# Patient Record
Sex: MALE | Race: WHITE | Employment: FULL TIME | URBAN - METROPOLITAN AREA
[De-identification: names, ages, dates, MRNs, and addresses within clinical notes are randomized per-mention and may not be internally consistent; named-entity substitution may affect disease eponyms.]

---

## 2017-06-14 PROBLEM — S39.012A LOW BACK STRAIN, INITIAL ENCOUNTER: Status: ACTIVE | Noted: 2017-06-14

## 2018-04-10 ENCOUNTER — APPOINTMENT (OUTPATIENT)
Dept: GENERAL RADIOLOGY | Facility: HOSPITAL | Age: 48
End: 2018-04-10
Attending: EMERGENCY MEDICINE
Payer: COMMERCIAL

## 2018-04-10 ENCOUNTER — HOSPITAL ENCOUNTER (OUTPATIENT)
Facility: HOSPITAL | Age: 48
Setting detail: OBSERVATION
Discharge: HOME | End: 2018-04-12
Attending: EMERGENCY MEDICINE | Admitting: INTERNAL MEDICINE
Payer: COMMERCIAL

## 2018-04-10 ENCOUNTER — APPOINTMENT (OUTPATIENT)
Dept: CT IMAGING | Facility: HOSPITAL | Age: 48
End: 2018-04-10
Attending: EMERGENCY MEDICINE
Payer: COMMERCIAL

## 2018-04-10 DIAGNOSIS — R25.1 TREMOR: ICD-10-CM

## 2018-04-10 DIAGNOSIS — R47.81 SLURRED SPEECH: Primary | ICD-10-CM

## 2018-04-10 PROCEDURE — 85730 THROMBOPLASTIN TIME PARTIAL: CPT | Performed by: EMERGENCY MEDICINE

## 2018-04-10 PROCEDURE — 80053 COMPREHEN METABOLIC PANEL: CPT | Performed by: EMERGENCY MEDICINE

## 2018-04-10 PROCEDURE — 99285 EMERGENCY DEPT VISIT HI MDM: CPT

## 2018-04-10 PROCEDURE — 36415 COLL VENOUS BLD VENIPUNCTURE: CPT

## 2018-04-10 PROCEDURE — 83036 HEMOGLOBIN GLYCOSYLATED A1C: CPT | Performed by: OTHER

## 2018-04-10 PROCEDURE — 80320 DRUG SCREEN QUANTALCOHOLS: CPT | Performed by: EMERGENCY MEDICINE

## 2018-04-10 PROCEDURE — 93005 ELECTROCARDIOGRAM TRACING: CPT

## 2018-04-10 PROCEDURE — 71045 X-RAY EXAM CHEST 1 VIEW: CPT | Performed by: EMERGENCY MEDICINE

## 2018-04-10 PROCEDURE — 70498 CT ANGIOGRAPHY NECK: CPT | Performed by: EMERGENCY MEDICINE

## 2018-04-10 PROCEDURE — 85610 PROTHROMBIN TIME: CPT | Performed by: EMERGENCY MEDICINE

## 2018-04-10 PROCEDURE — 93010 ELECTROCARDIOGRAM REPORT: CPT

## 2018-04-10 PROCEDURE — 70450 CT HEAD/BRAIN W/O DYE: CPT | Performed by: EMERGENCY MEDICINE

## 2018-04-10 PROCEDURE — 96372 THER/PROPH/DIAG INJ SC/IM: CPT

## 2018-04-10 PROCEDURE — 70496 CT ANGIOGRAPHY HEAD: CPT | Performed by: EMERGENCY MEDICINE

## 2018-04-10 PROCEDURE — 85025 COMPLETE CBC W/AUTO DIFF WBC: CPT | Performed by: EMERGENCY MEDICINE

## 2018-04-10 RX ORDER — FENOFIBRATE 67 MG/1
67 CAPSULE ORAL
Status: DISCONTINUED | OUTPATIENT
Start: 2018-04-11 | End: 2018-04-12

## 2018-04-10 RX ORDER — ASPIRIN 325 MG
325 TABLET ORAL DAILY
Status: DISCONTINUED | OUTPATIENT
Start: 2018-04-10 | End: 2018-04-12

## 2018-04-10 RX ORDER — ACETAMINOPHEN, ASPIRIN AND CAFFEINE 250; 250; 65 MG/1; MG/1; MG/1
1 TABLET, FILM COATED ORAL DAILY PRN
COMMUNITY
End: 2019-12-03

## 2018-04-10 RX ORDER — ATORVASTATIN CALCIUM 20 MG/1
20 TABLET, FILM COATED ORAL NIGHTLY
Status: ON HOLD | COMMUNITY
End: 2018-04-12

## 2018-04-10 RX ORDER — ASPIRIN 300 MG
300 SUPPOSITORY, RECTAL RECTAL DAILY
Status: DISCONTINUED | OUTPATIENT
Start: 2018-04-10 | End: 2018-04-12

## 2018-04-10 RX ORDER — METOCLOPRAMIDE HYDROCHLORIDE 5 MG/ML
10 INJECTION INTRAMUSCULAR; INTRAVENOUS EVERY 8 HOURS PRN
Status: DISCONTINUED | OUTPATIENT
Start: 2018-04-10 | End: 2018-04-12

## 2018-04-10 RX ORDER — METOPROLOL SUCCINATE 50 MG/1
50 TABLET, EXTENDED RELEASE ORAL DAILY
COMMUNITY
End: 2018-09-21

## 2018-04-10 RX ORDER — FAMOTIDINE 20 MG/1
20 TABLET ORAL 2 TIMES DAILY
Status: DISCONTINUED | OUTPATIENT
Start: 2018-04-10 | End: 2018-04-12

## 2018-04-10 RX ORDER — ENOXAPARIN SODIUM 100 MG/ML
40 INJECTION SUBCUTANEOUS DAILY
Status: DISCONTINUED | OUTPATIENT
Start: 2018-04-10 | End: 2018-04-12

## 2018-04-10 RX ORDER — ATORVASTATIN CALCIUM 80 MG/1
80 TABLET, FILM COATED ORAL NIGHTLY
Status: DISCONTINUED | OUTPATIENT
Start: 2018-04-10 | End: 2018-04-12

## 2018-04-10 RX ORDER — LACTULOSE 10 G/15ML
30 SOLUTION ORAL ONCE
Status: DISCONTINUED | OUTPATIENT
Start: 2018-04-10 | End: 2018-04-10

## 2018-04-10 RX ORDER — LABETALOL HYDROCHLORIDE 5 MG/ML
10 INJECTION, SOLUTION INTRAVENOUS EVERY 10 MIN PRN
Status: DISCONTINUED | OUTPATIENT
Start: 2018-04-10 | End: 2018-04-12

## 2018-04-10 RX ORDER — CLOPIDOGREL BISULFATE 75 MG/1
75 TABLET ORAL DAILY
Status: DISCONTINUED | OUTPATIENT
Start: 2018-04-10 | End: 2018-04-12

## 2018-04-10 RX ORDER — BISACODYL 10 MG
10 SUPPOSITORY, RECTAL RECTAL
Status: DISCONTINUED | OUTPATIENT
Start: 2018-04-10 | End: 2018-04-10

## 2018-04-10 RX ORDER — ONDANSETRON 2 MG/ML
4 INJECTION INTRAMUSCULAR; INTRAVENOUS EVERY 6 HOURS PRN
Status: DISCONTINUED | OUTPATIENT
Start: 2018-04-10 | End: 2018-04-10

## 2018-04-10 RX ORDER — SENNOSIDES 8.6 MG
17.2 TABLET ORAL NIGHTLY
Status: DISCONTINUED | OUTPATIENT
Start: 2018-04-10 | End: 2018-04-12

## 2018-04-10 RX ORDER — ATORVASTATIN CALCIUM 20 MG/1
20 TABLET, FILM COATED ORAL NIGHTLY
Status: DISCONTINUED | OUTPATIENT
Start: 2018-04-10 | End: 2018-04-10 | Stop reason: DRUGHIGH

## 2018-04-10 RX ORDER — ACETAMINOPHEN 650 MG/1
650 SUPPOSITORY RECTAL EVERY 4 HOURS PRN
Status: DISCONTINUED | OUTPATIENT
Start: 2018-04-10 | End: 2018-04-12

## 2018-04-10 RX ORDER — MORPHINE SULFATE 4 MG/ML
2 INJECTION, SOLUTION INTRAMUSCULAR; INTRAVENOUS EVERY 2 HOUR PRN
Status: DISCONTINUED | OUTPATIENT
Start: 2018-04-10 | End: 2018-04-12

## 2018-04-10 RX ORDER — POLYETHYLENE GLYCOL 3350 17 G/17G
17 POWDER, FOR SOLUTION ORAL DAILY PRN
Status: DISCONTINUED | OUTPATIENT
Start: 2018-04-10 | End: 2018-04-12

## 2018-04-10 RX ORDER — SODIUM PHOSPHATE, DIBASIC AND SODIUM PHOSPHATE, MONOBASIC 7; 19 G/133ML; G/133ML
1 ENEMA RECTAL ONCE AS NEEDED
Status: DISCONTINUED | OUTPATIENT
Start: 2018-04-10 | End: 2018-04-10

## 2018-04-10 RX ORDER — SODIUM PHOSPHATE, DIBASIC AND SODIUM PHOSPHATE, MONOBASIC 7; 19 G/133ML; G/133ML
1 ENEMA RECTAL ONCE AS NEEDED
Status: DISCONTINUED | OUTPATIENT
Start: 2018-04-10 | End: 2018-04-12

## 2018-04-10 RX ORDER — ONDANSETRON 2 MG/ML
4 INJECTION INTRAMUSCULAR; INTRAVENOUS EVERY 6 HOURS PRN
Status: DISCONTINUED | OUTPATIENT
Start: 2018-04-10 | End: 2018-04-12

## 2018-04-10 RX ORDER — METOPROLOL SUCCINATE 50 MG/1
50 TABLET, EXTENDED RELEASE ORAL DAILY
Status: DISCONTINUED | OUTPATIENT
Start: 2018-04-10 | End: 2018-04-12

## 2018-04-10 RX ORDER — CLOPIDOGREL BISULFATE 75 MG/1
75 TABLET ORAL DAILY
COMMUNITY
End: 2018-09-21

## 2018-04-10 RX ORDER — ACETAMINOPHEN 325 MG/1
650 TABLET ORAL EVERY 4 HOURS PRN
Status: DISCONTINUED | OUTPATIENT
Start: 2018-04-10 | End: 2018-04-12

## 2018-04-10 RX ORDER — LEVOTHYROXINE SODIUM 0.12 MG/1
250 TABLET ORAL
Status: DISCONTINUED | OUTPATIENT
Start: 2018-04-11 | End: 2018-04-12

## 2018-04-10 RX ORDER — ACETAMINOPHEN 325 MG/1
650 TABLET ORAL EVERY 6 HOURS PRN
Status: DISCONTINUED | OUTPATIENT
Start: 2018-04-10 | End: 2018-04-10

## 2018-04-10 RX ORDER — SODIUM CHLORIDE 9 MG/ML
INJECTION, SOLUTION INTRAVENOUS CONTINUOUS
Status: DISCONTINUED | OUTPATIENT
Start: 2018-04-10 | End: 2018-04-11

## 2018-04-10 RX ORDER — MORPHINE SULFATE 4 MG/ML
1 INJECTION, SOLUTION INTRAMUSCULAR; INTRAVENOUS EVERY 2 HOUR PRN
Status: DISCONTINUED | OUTPATIENT
Start: 2018-04-10 | End: 2018-04-12

## 2018-04-10 RX ORDER — DOCUSATE SODIUM 100 MG/1
100 CAPSULE, LIQUID FILLED ORAL 2 TIMES DAILY
Status: DISCONTINUED | OUTPATIENT
Start: 2018-04-10 | End: 2018-04-12

## 2018-04-10 RX ORDER — FAMOTIDINE 10 MG/ML
20 INJECTION, SOLUTION INTRAVENOUS 2 TIMES DAILY
Status: DISCONTINUED | OUTPATIENT
Start: 2018-04-10 | End: 2018-04-12

## 2018-04-10 RX ORDER — ENOXAPARIN SODIUM 100 MG/ML
40 INJECTION SUBCUTANEOUS NIGHTLY
Status: DISCONTINUED | OUTPATIENT
Start: 2018-04-10 | End: 2018-04-10

## 2018-04-10 RX ORDER — PHENYLEPHRINE HCL IN 0.9% NACL 50MG/250ML
PLASTIC BAG, INJECTION (ML) INTRAVENOUS CONTINUOUS PRN
Status: DISCONTINUED | OUTPATIENT
Start: 2018-04-10 | End: 2018-04-12

## 2018-04-10 RX ORDER — POLYETHYLENE GLYCOL 3350 17 G/17G
17 POWDER, FOR SOLUTION ORAL DAILY PRN
Status: DISCONTINUED | OUTPATIENT
Start: 2018-04-10 | End: 2018-04-10

## 2018-04-10 RX ORDER — BISACODYL 10 MG
10 SUPPOSITORY, RECTAL RECTAL
Status: DISCONTINUED | OUTPATIENT
Start: 2018-04-10 | End: 2018-04-12
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
## 2018-04-11 ENCOUNTER — APPOINTMENT (OUTPATIENT)
Dept: MRI IMAGING | Facility: HOSPITAL | Age: 48
End: 2018-04-11
Attending: Other
Payer: COMMERCIAL

## 2018-04-11 ENCOUNTER — APPOINTMENT (OUTPATIENT)
Dept: CV DIAGNOSTICS | Facility: HOSPITAL | Age: 48
End: 2018-04-11
Attending: Other
Payer: COMMERCIAL

## 2018-04-11 PROCEDURE — 85025 COMPLETE CBC W/AUTO DIFF WBC: CPT | Performed by: HOSPITALIST

## 2018-04-11 PROCEDURE — 97165 OT EVAL LOW COMPLEX 30 MIN: CPT

## 2018-04-11 PROCEDURE — 70553 MRI BRAIN STEM W/O & W/DYE: CPT | Performed by: OTHER

## 2018-04-11 PROCEDURE — 93306 TTE W/DOPPLER COMPLETE: CPT | Performed by: OTHER

## 2018-04-11 PROCEDURE — 84443 ASSAY THYROID STIM HORMONE: CPT | Performed by: HOSPITALIST

## 2018-04-11 PROCEDURE — 80048 BASIC METABOLIC PNL TOTAL CA: CPT | Performed by: HOSPITALIST

## 2018-04-11 PROCEDURE — 83721 ASSAY OF BLOOD LIPOPROTEIN: CPT | Performed by: OTHER

## 2018-04-11 PROCEDURE — 95819 EEG AWAKE AND ASLEEP: CPT

## 2018-04-11 PROCEDURE — 80061 LIPID PANEL: CPT | Performed by: OTHER

## 2018-04-11 PROCEDURE — 80307 DRUG TEST PRSMV CHEM ANLYZR: CPT | Performed by: EMERGENCY MEDICINE

## 2018-04-11 PROCEDURE — A9576 INJ PROHANCE MULTIPACK: HCPCS | Performed by: INTERNAL MEDICINE

## 2018-04-11 PROCEDURE — 97161 PT EVAL LOW COMPLEX 20 MIN: CPT

## 2018-04-11 PROCEDURE — 81003 URINALYSIS AUTO W/O SCOPE: CPT | Performed by: EMERGENCY MEDICINE

## 2018-04-11 PROCEDURE — 82962 GLUCOSE BLOOD TEST: CPT

## 2018-04-11 PROCEDURE — 85610 PROTHROMBIN TIME: CPT | Performed by: OTHER

## 2018-04-11 PROCEDURE — 83735 ASSAY OF MAGNESIUM: CPT | Performed by: HOSPITALIST

## 2018-04-11 PROCEDURE — 92610 EVALUATE SWALLOWING FUNCTION: CPT

## 2018-04-11 RX ORDER — BUPROPION HYDROCHLORIDE 150 MG/1
150 TABLET, EXTENDED RELEASE ORAL 2 TIMES DAILY
Status: DISCONTINUED | OUTPATIENT
Start: 2018-04-11 | End: 2018-04-12

## 2018-04-11 RX ORDER — POTASSIUM CHLORIDE 20 MEQ/1
40 TABLET, EXTENDED RELEASE ORAL ONCE
Status: COMPLETED | OUTPATIENT
Start: 2018-04-11 | End: 2018-04-11
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
## 2018-04-11 NOTE — PLAN OF CARE
Patient alert and oriented. NSR on tele. Room air. Echo completed. EEG completed. Waiting for MRI. Neuro s/s have all resolved. NIH 0. Patient is anxious to leave.      CARDIOVASCULAR - ADULT    • Maintains optimal cardiac output and hemodynamic stability P

## 2018-04-11 NOTE — PROGRESS NOTES
NURSING ADMISSION NOTE      Patient admitted via cart from the ED. Oriented to room. Safety precautions initiated. Bed in low position. Call light in reach.
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
## 2018-04-11 NOTE — CONSULTS
[address] [facility]  Neurology  [facility] Consultation Report    Leslye Ask Patient Status:  Observation    1970 MRN [id number]   [facility] 7NE-A Attending Marcelo Sifuentes,    Hosp Day # 0 PCP Camila Solis MD   Date of Admiss ANGIOPLASTY (CORONARY)  No date: APPENDECTOMY  No date: REMOVAL OF SPERM DUCT(S)  5/4/16: STENT, COATED/COV W/DEL SYS Right      Comment: stents x2 rca and l stent to lad  Family History  Family History   Problem Relation Age of Onset   • Diabetes Father (DULCOLAX) rectal suppository 10 mg 10 mg Rectal Daily PRN   FLEET ENEMA (FLEET) 7-19 GM/118ML enema 133 mL 1 enema Rectal Once PRN   ondansetron HCl (ZOFRAN) injection 4 mg 4 mg Intravenous Q6H PRN   Or      Metoclopramide HCl (REGLAN) injection 10 mg 10 denies abdominal pain, denies heartburn, denies vomiting, constipation,diarrhea,nausea; no rectal bleeding  : no complaint of urinary incontinence  PSYCH: denies depression or anxiety  HEMATOLOGIC: no reports of excessive bruising or bleeding  ENDOCRINE: present at 2+ throughout. There is no clonus at the ankle.     Results:   Laboratory Data:    Lab Results  Component Value Date   WBC 5.8 04/11/2018   HGB 15.2 04/11/2018   HCT 43.5 04/11/2018   .0 04/11/2018   CREATSERUM 1.18 04/10/2018   BUN 28 multiple risk factors for stroke. Primary risk reduction is recommended to continue, clopidogrel is recommended for stroke prophylaxis. There is no evidence on CT angiogram for arterial dissection. Echocardiography will be done later today.     I've expl
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
## 2018-04-11 NOTE — PROCEDURES
ELECTROENCEPHALOGRAM REPORT      Patient Name: Marshall Lanier  Chart ID: [id number]  Ordering Physician: Ruthy Vergara M.D. Date of Test: April 11, 2018  Patient Diagnosis: involuntary movements of the right arm    A routine EEG was obtained.   No
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
## 2018-04-11 NOTE — PLAN OF CARE
Pt here for slurred speech and right arm tremor. A&Ox4. NSR on tele, denies any chest pain. BP improved after receiving scheduled medications. Presenting symptoms resolved. Neuro assessment stable, no deficits.  Pt does report that he has been having short

## 2018-04-11 NOTE — CM/SW NOTE
Pt on CVA protocol. Pt not found to have a CVA. Pt was screened during rounds and no needs are identified at this time. RN to contact SW/CM if needs arise.       04/11/18 1200   CM/SW Screening   Referral Source Physician   Information Source Nursing rou
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
## 2018-04-12 VITALS
HEIGHT: 70 IN | RESPIRATION RATE: 18 BRPM | DIASTOLIC BLOOD PRESSURE: 95 MMHG | SYSTOLIC BLOOD PRESSURE: 152 MMHG | HEART RATE: 73 BPM | BODY MASS INDEX: 38 KG/M2 | WEIGHT: 265.44 LBS | TEMPERATURE: 98 F | OXYGEN SATURATION: 98 %

## 2018-04-12 PROCEDURE — 84132 ASSAY OF SERUM POTASSIUM: CPT | Performed by: OTHER

## 2018-04-12 PROCEDURE — 82962 GLUCOSE BLOOD TEST: CPT

## 2018-04-12 RX ORDER — ASPIRIN 81 MG/1
81 TABLET ORAL DAILY
Status: DISCONTINUED | OUTPATIENT
Start: 2018-04-12 | End: 2018-04-12

## 2018-04-12 RX ORDER — ATORVASTATIN CALCIUM 80 MG/1
80 TABLET, FILM COATED ORAL NIGHTLY
Qty: 90 TABLET | Refills: 0 | Status: SHIPPED | OUTPATIENT
Start: 2018-04-12 | End: 2018-09-19

## 2018-04-12 NOTE — CM/SW NOTE
04/12/18 1400   Discharge disposition   Expected discharge disposition Home or Self   Name of [facility] services after discharge None   Discharge transportation Private car

## 2018-04-12 NOTE — SLP NOTE
MRI negative for CVA; communication screening per CVA protocol is not warranted at this time. Will d/c from services at this time.     Ceci Baez MA, [id number] [facility]  Pager

## 2018-04-12 NOTE — CONSULTS
[facility] Cardiology Consultation    Bel Johnson Patient Status:  Observation    1970 MRN [id number]   [facility] 7NE-A Attending Mylene Lovett MD   Hosp Day # 0 PCP Aria Wood MD     Reason for Consultation:  TIA, CAD      History of Intravenous BID   • enoxaparin  40 mg Subcutaneous Daily   • Clopidogrel Bisulfate  75 mg Oral Daily   • Metoprolol Succinate ER  50 mg Oral Daily       Continuous Infusions:  • NiCARdipine     • phenylephrine in NaCl         Social History:   reports that neuro sx's - per neuro. Possible TIA. 2. CAD with PCI of LAD and RCA 5/16. Low normal EF, anteroapical hypokinesis on echo - stable. 3. Tobacco abuse  4. Non compliance  5. HTN  6. HL        Plan:  Long talk with him.   I reinforced the need for physici

## 2018-04-12 NOTE — PLAN OF CARE
Pt discharged per walking with this RN. In no apparent distress and no c/o. Cards and neuro ok with d/c. Pt has f/u appts with primary and with cards. Neuro will be prn. D/c instr and meds reviewed.

## 2018-04-12 NOTE — PLAN OF CARE
Assumed care at 1900. Alert and oriented x4. Telemetry monitor reading SR. Assessment remains unchanged. Plan new cards consult. Call light in reach at bedside. Will continue to monitor.     CARDIOVASCULAR - ADULT    • Maintains optimal cardiac output and h

## 2018-04-16 NOTE — PROGRESS NOTES
[facility] Hospitalist note    PCP: Darius Hernandez MD    Chief Complaint:  f/u    SUBJECTIVE:  Feeling improved. Wants to go home.     OBJECTIVE:  Vitals stable    Intake/Output:  No intake or output data in the 24 hours ending 04/16/18 1703    Last 3 Weights  04/12 Ness Adan MD  [facility] Hospitalist  Pager: [phone]

## 2018-09-20 ENCOUNTER — CHARTING TRANS (OUTPATIENT)
Dept: OTHER | Age: 48
End: 2018-09-20

## 2018-09-20 ASSESSMENT — PAIN SCALES - GENERAL: PAINLEVEL_OUTOF10: 10

## 2018-09-28 PROBLEM — K57.92 DIVERTICULITIS: Status: ACTIVE | Noted: 2018-09-28

## 2019-03-05 VITALS
DIASTOLIC BLOOD PRESSURE: 122 MMHG | TEMPERATURE: 97.8 F | OXYGEN SATURATION: 95 % | RESPIRATION RATE: 20 BRPM | HEART RATE: 109 BPM | SYSTOLIC BLOOD PRESSURE: 196 MMHG

## 2019-06-04 ENCOUNTER — HOSPITAL ENCOUNTER (EMERGENCY)
Facility: HOSPITAL | Age: 49
Discharge: HOME | End: 2019-06-04
Attending: EMERGENCY MEDICINE
Payer: COMMERCIAL

## 2019-06-04 ENCOUNTER — APPOINTMENT (OUTPATIENT)
Dept: GENERAL RADIOLOGY | Facility: HOSPITAL | Age: 49
End: 2019-06-04
Attending: EMERGENCY MEDICINE
Payer: COMMERCIAL

## 2019-06-04 ENCOUNTER — APPOINTMENT (OUTPATIENT)
Dept: CT IMAGING | Facility: HOSPITAL | Age: 49
End: 2019-06-04
Attending: EMERGENCY MEDICINE
Payer: COMMERCIAL

## 2019-06-04 VITALS
OXYGEN SATURATION: 95 % | TEMPERATURE: 99 F | RESPIRATION RATE: 21 BRPM | HEART RATE: 71 BPM | DIASTOLIC BLOOD PRESSURE: 109 MMHG | SYSTOLIC BLOOD PRESSURE: 158 MMHG

## 2019-06-04 DIAGNOSIS — I15.9 SECONDARY HYPERTENSION: Primary | ICD-10-CM

## 2019-06-04 DIAGNOSIS — R10.9 ABDOMINAL PAIN OF UNKNOWN ETIOLOGY: ICD-10-CM

## 2019-06-04 PROCEDURE — 96374 THER/PROPH/DIAG INJ IV PUSH: CPT

## 2019-06-04 PROCEDURE — 99285 EMERGENCY DEPT VISIT HI MDM: CPT

## 2019-06-04 PROCEDURE — 80053 COMPREHEN METABOLIC PANEL: CPT | Performed by: EMERGENCY MEDICINE

## 2019-06-04 PROCEDURE — 93005 ELECTROCARDIOGRAM TRACING: CPT

## 2019-06-04 PROCEDURE — 74177 CT ABD & PELVIS W/CONTRAST: CPT | Performed by: EMERGENCY MEDICINE

## 2019-06-04 PROCEDURE — 81003 URINALYSIS AUTO W/O SCOPE: CPT | Performed by: EMERGENCY MEDICINE

## 2019-06-04 PROCEDURE — 83690 ASSAY OF LIPASE: CPT | Performed by: EMERGENCY MEDICINE

## 2019-06-04 PROCEDURE — 85025 COMPLETE CBC W/AUTO DIFF WBC: CPT | Performed by: EMERGENCY MEDICINE

## 2019-06-04 PROCEDURE — 71046 X-RAY EXAM CHEST 2 VIEWS: CPT | Performed by: EMERGENCY MEDICINE

## 2019-06-04 PROCEDURE — 93010 ELECTROCARDIOGRAM REPORT: CPT

## 2019-06-04 PROCEDURE — 84484 ASSAY OF TROPONIN QUANT: CPT | Performed by: EMERGENCY MEDICINE

## 2019-06-04 RX ORDER — METOPROLOL SUCCINATE 100 MG/1
100 TABLET, EXTENDED RELEASE ORAL DAILY
Qty: 30 TABLET | Refills: 0 | Status: SHIPPED | OUTPATIENT
Start: 2019-06-04 | End: 2019-06-10

## 2019-06-04 RX ORDER — LABETALOL HYDROCHLORIDE 5 MG/ML
20 INJECTION, SOLUTION INTRAVENOUS ONCE
Status: COMPLETED | OUTPATIENT
Start: 2019-06-04 | End: 2019-06-04

## 2019-06-04 NOTE — ED PROVIDER NOTES
Patient Seen in: [facility] Emergency Department    History   Patient presents with:  Hypertension (cardiovascular)    Stated Complaint: hypertension    HPI    This is a 50-year-old male who arrives here with complaints of high blood pressure.   The stated complaint: hypertension  Other systems are as noted in HPI. Constitutional and vital signs reviewed. All other systems reviewed and negative except as noted above.     Physical Exam     ED Triage Vitals [06/04/19 1120]   BP (!) 210/140   Pulse Final result                 Please view results for these tests on the individual orders.    URINALYSIS WITH CULTURE REFLEX   RAINBOW DRAW BLUE   RAINBOW DRAW LAVENDER   RAINBOW DRAW LIGHT GREEN   RAINBOW DRAW GOLD   CBC W/ DIFFERENTIAL             The the ACR (American College of Radiology) NRDR ([address]) which includes the Dose Index Registry. PATIENT STATED HISTORY:(As transcribed by Technologist)  Patient is here wuth LLQ pain from 3 weeks.  Patient was diagnosed with duverti normal limits. ,  Troponin, CBC was normal.    He does have some some nonspecific abnormalities on the work-up and needs a follow-up. I have also discussed with him he may need a colonoscopy. .  I discussed him take 1 baby aspirin.   Increase his Toprol to 1

## 2019-06-04 NOTE — ED INITIAL ASSESSMENT (HPI)
Patient presents with high blood pressure, states it was 185/135. Patient states he has a pain in lower back to left hip for several weeks. Patient had stopped taking his medications since October, hx of stents.

## 2019-12-27 PROCEDURE — 88305 TISSUE EXAM BY PATHOLOGIST: CPT | Performed by: INTERNAL MEDICINE

## 2021-06-17 ENCOUNTER — TELEPHONE (OUTPATIENT)
Dept: NEUROLOGY | Age: 51
End: 2021-06-17

## 2021-06-17 DIAGNOSIS — R52 PAIN: Primary | ICD-10-CM

## 2021-09-17 PROBLEM — E66.01 OBESITY, MORBID (HCC): Status: ACTIVE | Noted: 2021-09-17

## 2021-09-17 PROBLEM — E78.5 DYSLIPIDEMIA: Status: ACTIVE | Noted: 2021-09-17

## 2021-09-17 PROBLEM — R06.83 SNORING: Status: ACTIVE | Noted: 2021-09-17

## 2022-10-01 ENCOUNTER — APPOINTMENT (OUTPATIENT)
Dept: GENERAL RADIOLOGY | Facility: HOSPITAL | Age: 52
End: 2022-10-01
Attending: EMERGENCY MEDICINE
Payer: COMMERCIAL

## 2022-10-01 ENCOUNTER — HOSPITAL ENCOUNTER (EMERGENCY)
Facility: HOSPITAL | Age: 52
Discharge: LEFT BEFORE DISCHARGE | End: 2022-10-01
Attending: EMERGENCY MEDICINE
Payer: COMMERCIAL

## 2022-10-01 VITALS
DIASTOLIC BLOOD PRESSURE: 96 MMHG | TEMPERATURE: 98 F | BODY MASS INDEX: 42.95 KG/M2 | RESPIRATION RATE: 23 BRPM | OXYGEN SATURATION: 95 % | WEIGHT: 300 LBS | HEART RATE: 79 BPM | HEIGHT: 70 IN | SYSTOLIC BLOOD PRESSURE: 149 MMHG

## 2022-10-01 DIAGNOSIS — R07.9 EXERTIONAL CHEST PAIN: Primary | ICD-10-CM

## 2022-10-01 LAB
ALBUMIN SERPL-MCNC: 3.8 G/DL (ref 3.4–5)
ALBUMIN/GLOB SERPL: 1 {RATIO} (ref 1–2)
ALP LIVER SERPL-CCNC: 111 U/L
ALT SERPL-CCNC: 135 U/L
ANION GAP SERPL CALC-SCNC: 6 MMOL/L (ref 0–18)
AST SERPL-CCNC: 49 U/L (ref 15–37)
BASOPHILS # BLD AUTO: 0.06 X10(3) UL (ref 0–0.2)
BASOPHILS NFR BLD AUTO: 0.9 %
BILIRUB SERPL-MCNC: 0.5 MG/DL (ref 0.1–2)
BUN BLD-MCNC: 14 MG/DL (ref 7–18)
CALCIUM BLD-MCNC: 9 MG/DL (ref 8.5–10.1)
CHLORIDE SERPL-SCNC: 106 MMOL/L (ref 98–112)
CHOLEST SERPL-MCNC: 138 MG/DL (ref ?–200)
CO2 SERPL-SCNC: 25 MMOL/L (ref 21–32)
CREAT BLD-MCNC: 0.96 MG/DL
EOSINOPHIL # BLD AUTO: 0.21 X10(3) UL (ref 0–0.7)
EOSINOPHIL NFR BLD AUTO: 3.2 %
ERYTHROCYTE [DISTWIDTH] IN BLOOD BY AUTOMATED COUNT: 12.4 %
GFR SERPLBLD BASED ON 1.73 SQ M-ARVRAT: 96 ML/MIN/1.73M2 (ref 60–?)
GLOBULIN PLAS-MCNC: 4 G/DL (ref 2.8–4.4)
GLUCOSE BLD-MCNC: 146 MG/DL (ref 70–99)
HCT VFR BLD AUTO: 42.4 %
HDLC SERPL-MCNC: 35 MG/DL (ref 40–59)
HGB BLD-MCNC: 15 G/DL
IMM GRANULOCYTES # BLD AUTO: 0.03 X10(3) UL (ref 0–1)
IMM GRANULOCYTES NFR BLD: 0.5 %
LDLC SERPL CALC-MCNC: 76 MG/DL (ref ?–100)
LYMPHOCYTES # BLD AUTO: 2.11 X10(3) UL (ref 1–4)
LYMPHOCYTES NFR BLD AUTO: 31.7 %
MCH RBC QN AUTO: 30.4 PG (ref 26–34)
MCHC RBC AUTO-ENTMCNC: 35.4 G/DL (ref 31–37)
MCV RBC AUTO: 86 FL
MONOCYTES # BLD AUTO: 0.66 X10(3) UL (ref 0.1–1)
MONOCYTES NFR BLD AUTO: 9.9 %
NEUTROPHILS # BLD AUTO: 3.58 X10 (3) UL (ref 1.5–7.7)
NEUTROPHILS # BLD AUTO: 3.58 X10(3) UL (ref 1.5–7.7)
NEUTROPHILS NFR BLD AUTO: 53.8 %
NONHDLC SERPL-MCNC: 103 MG/DL (ref ?–130)
NT-PROBNP SERPL-MCNC: 17 PG/ML (ref ?–125)
OSMOLALITY SERPL CALC.SUM OF ELEC: 287 MOSM/KG (ref 275–295)
PLATELET # BLD AUTO: 202 10(3)UL (ref 150–450)
POTASSIUM SERPL-SCNC: 3.9 MMOL/L (ref 3.5–5.1)
PROT SERPL-MCNC: 7.8 G/DL (ref 6.4–8.2)
RBC # BLD AUTO: 4.93 X10(6)UL
SARS-COV-2 RNA RESP QL NAA+PROBE: NOT DETECTED
SODIUM SERPL-SCNC: 137 MMOL/L (ref 136–145)
TRIGL SERPL-MCNC: 152 MG/DL (ref 30–149)
TROPONIN I HIGH SENSITIVITY: 140 NG/L
TROPONIN I HIGH SENSITIVITY: 147 NG/L
VLDLC SERPL CALC-MCNC: 24 MG/DL (ref 0–30)
WBC # BLD AUTO: 6.7 X10(3) UL (ref 4–11)

## 2022-10-01 PROCEDURE — 99285 EMERGENCY DEPT VISIT HI MDM: CPT

## 2022-10-01 PROCEDURE — 83880 ASSAY OF NATRIURETIC PEPTIDE: CPT | Performed by: EMERGENCY MEDICINE

## 2022-10-01 PROCEDURE — 80061 LIPID PANEL: CPT | Performed by: EMERGENCY MEDICINE

## 2022-10-01 PROCEDURE — 93010 ELECTROCARDIOGRAM REPORT: CPT

## 2022-10-01 PROCEDURE — 93005 ELECTROCARDIOGRAM TRACING: CPT

## 2022-10-01 PROCEDURE — 85025 COMPLETE CBC W/AUTO DIFF WBC: CPT | Performed by: EMERGENCY MEDICINE

## 2022-10-01 PROCEDURE — 36415 COLL VENOUS BLD VENIPUNCTURE: CPT

## 2022-10-01 PROCEDURE — 71045 X-RAY EXAM CHEST 1 VIEW: CPT | Performed by: EMERGENCY MEDICINE

## 2022-10-01 PROCEDURE — 80053 COMPREHEN METABOLIC PANEL: CPT | Performed by: EMERGENCY MEDICINE

## 2022-10-01 PROCEDURE — 84484 ASSAY OF TROPONIN QUANT: CPT | Performed by: EMERGENCY MEDICINE

## 2022-10-01 PROCEDURE — 99284 EMERGENCY DEPT VISIT MOD MDM: CPT

## 2022-10-01 NOTE — ED INITIAL ASSESSMENT (HPI)
Pt reports intermittent chest pain x2 weeks that is not improving. Pt was at his doctors office today due to the chest pain and was instructed to come to the ER. Pt denies any pain at this time, reports pain occurs when he lays on his side, going up the stairs, or walk long distances, or when it is cold. Pt took 4 baby aspirin PTA.

## 2022-10-02 LAB
ATRIAL RATE: 66 BPM
P AXIS: 38 DEGREES
P-R INTERVAL: 180 MS
Q-T INTERVAL: 396 MS
QRS DURATION: 90 MS
QTC CALCULATION (BEZET): 415 MS
R AXIS: 11 DEGREES
T AXIS: 36 DEGREES
VENTRICULAR RATE: 66 BPM

## 2022-10-06 NOTE — H&P
Patient seen and examined independently. H and P dated 10/6/22 reviewed. No changes in H and P. Risks and benefits of procedure were discussed with patient. Airway examined. Patient is ASA class 2 and Mallampati class 2. Pt is appropriate for conscious sedation. No history of difficult airway. The risks, benefits, indications and alternatives of left heart catheterization and coronary angigoraphy with possible percutaneous coronary intervention were discussed. The risks include, but are not limited to: bleeding, anemia requiring blood transfusion, allergic reaction, hypotension, infection, vascular injury, injury to upper or lower extremity requiring endovascular or open surgical repair,  kidney failure, stroke, cardiac or pulmonary artery perforation, pericardial effusion and tamponade requiring pericardiocentesis, myocardial infarction (heart attack), respiratory failure needing intubation, cardiac arrest, need for emergent surgery, and death. Benefits of the procedure include: symptomatic improvement, diagnosis of coronary artery disease or other forms of heart disease and possibly prevention of myocardial infarction. Alternatives to the procedure include: not performing cardiac catheterization, treatment with medications only, and observation. The patient and any accompanying family have considered the above, all questions have been answered to the best of my ability to their satisfaction, and have decided to proceed with the procedure. Appropriate candidate for Sedation/Analgesia: Yes  Plan for Sedation reviewed: Yes    Explained Anesthesia options and attendant risks, and have determined patient is an appropriate candidate.  Yes  Consent for Sedation obtained: Yes  Patient reevaluated immediately prior to Sedation/Analgesia: Yes

## 2022-10-11 ENCOUNTER — HOSPITAL ENCOUNTER (OUTPATIENT)
Dept: INTERVENTIONAL RADIOLOGY/VASCULAR | Facility: HOSPITAL | Age: 52
Discharge: HOME | End: 2022-10-12
Attending: INTERNAL MEDICINE | Admitting: INTERNAL MEDICINE
Payer: COMMERCIAL

## 2022-10-11 DIAGNOSIS — I20.9 ANGINA PECTORIS (HCC): ICD-10-CM

## 2022-10-11 LAB — ISTAT ACTIVATED CLOTTING TIME: 254 SECONDS (ref 74–137)

## 2022-10-11 PROCEDURE — 99153 MOD SED SAME PHYS/QHP EA: CPT | Performed by: INTERNAL MEDICINE

## 2022-10-11 PROCEDURE — 85347 COAGULATION TIME ACTIVATED: CPT

## 2022-10-11 PROCEDURE — 99152 MOD SED SAME PHYS/QHP 5/>YRS: CPT | Performed by: INTERNAL MEDICINE

## 2022-10-11 PROCEDURE — 93005 ELECTROCARDIOGRAM TRACING: CPT

## 2022-10-11 PROCEDURE — 4A023N7 MEASUREMENT OF CARDIAC SAMPLING AND PRESSURE, LEFT HEART, PERCUTANEOUS APPROACH: ICD-10-PCS | Performed by: INTERNAL MEDICINE

## 2022-10-11 PROCEDURE — 0270346 DILATION OF CORONARY ARTERY, ONE ARTERY, BIFURCATION, WITH DRUG-ELUTING INTRALUMINAL DEVICE, PERCUTANEOUS APPROACH: ICD-10-PCS | Performed by: INTERNAL MEDICINE

## 2022-10-11 PROCEDURE — B240ZZ3 ULTRASONOGRAPHY OF SINGLE CORONARY ARTERY, INTRAVASCULAR: ICD-10-PCS | Performed by: INTERNAL MEDICINE

## 2022-10-11 PROCEDURE — B2151ZZ FLUOROSCOPY OF LEFT HEART USING LOW OSMOLAR CONTRAST: ICD-10-PCS | Performed by: INTERNAL MEDICINE

## 2022-10-11 PROCEDURE — 92978 ENDOLUMINL IVUS OCT C 1ST: CPT | Performed by: INTERNAL MEDICINE

## 2022-10-11 PROCEDURE — 93458 L HRT ARTERY/VENTRICLE ANGIO: CPT | Performed by: INTERNAL MEDICINE

## 2022-10-11 PROCEDURE — B2111ZZ FLUOROSCOPY OF MULTIPLE CORONARY ARTERIES USING LOW OSMOLAR CONTRAST: ICD-10-PCS | Performed by: INTERNAL MEDICINE

## 2022-10-11 PROCEDURE — 93010 ELECTROCARDIOGRAM REPORT: CPT | Performed by: INTERNAL MEDICINE

## 2022-10-11 PROCEDURE — 92921 HC PTCA EA ADDL MAJOR CORONARY ARTERY/BRANCH: CPT | Performed by: INTERNAL MEDICINE

## 2022-10-11 RX ORDER — ATORVASTATIN CALCIUM 80 MG/1
80 TABLET, FILM COATED ORAL DAILY
Status: DISCONTINUED | OUTPATIENT
Start: 2022-10-12 | End: 2022-10-12

## 2022-10-11 RX ORDER — LIDOCAINE HYDROCHLORIDE 10 MG/ML
INJECTION, SOLUTION EPIDURAL; INFILTRATION; INTRACAUDAL; PERINEURAL
Status: COMPLETED
Start: 2022-10-11 | End: 2022-10-11

## 2022-10-11 RX ORDER — SODIUM CHLORIDE 9 MG/ML
INJECTION, SOLUTION INTRAVENOUS CONTINUOUS
Status: ACTIVE | OUTPATIENT
Start: 2022-10-11 | End: 2022-10-11

## 2022-10-11 RX ORDER — METOPROLOL SUCCINATE 100 MG/1
100 TABLET, EXTENDED RELEASE ORAL
Status: DISCONTINUED | OUTPATIENT
Start: 2022-10-12 | End: 2022-10-12

## 2022-10-11 RX ORDER — NITROGLYCERIN 20 MG/100ML
INJECTION INTRAVENOUS
Status: COMPLETED
Start: 2022-10-11 | End: 2022-10-11

## 2022-10-11 RX ORDER — LISINOPRIL 40 MG/1
40 TABLET ORAL DAILY
Status: DISCONTINUED | OUTPATIENT
Start: 2022-10-11 | End: 2022-10-12

## 2022-10-11 RX ORDER — MIDAZOLAM HYDROCHLORIDE 1 MG/ML
INJECTION INTRAMUSCULAR; INTRAVENOUS
Status: COMPLETED
Start: 2022-10-11 | End: 2022-10-11

## 2022-10-11 RX ORDER — VERAPAMIL HYDROCHLORIDE 2.5 MG/ML
INJECTION, SOLUTION INTRAVENOUS
Status: COMPLETED
Start: 2022-10-11 | End: 2022-10-11

## 2022-10-11 RX ORDER — FENOFIBRATE 67 MG/1
67 CAPSULE ORAL
Refills: 0 | Status: DISCONTINUED | OUTPATIENT
Start: 2022-10-12 | End: 2022-10-12

## 2022-10-11 RX ORDER — HYDROCHLOROTHIAZIDE 25 MG/1
25 TABLET ORAL DAILY
Status: DISCONTINUED | OUTPATIENT
Start: 2022-10-11 | End: 2022-10-12

## 2022-10-11 RX ORDER — ASPIRIN 81 MG/1
81 TABLET, CHEWABLE ORAL DAILY
Status: DISCONTINUED | OUTPATIENT
Start: 2022-10-12 | End: 2022-10-12

## 2022-10-11 RX ORDER — HEPARIN SODIUM 5000 [USP'U]/ML
INJECTION, SOLUTION INTRAVENOUS; SUBCUTANEOUS
Status: COMPLETED
Start: 2022-10-11 | End: 2022-10-11

## 2022-10-11 RX ORDER — SODIUM CHLORIDE 9 MG/ML
INJECTION, SOLUTION INTRAVENOUS
Status: DISCONTINUED | OUTPATIENT
Start: 2022-10-12 | End: 2022-10-11 | Stop reason: HOSPADM

## 2022-10-11 RX ORDER — PRASUGREL 10 MG/1
TABLET, FILM COATED ORAL
Status: COMPLETED
Start: 2022-10-11 | End: 2022-10-11

## 2022-10-11 RX ORDER — AMLODIPINE BESYLATE 5 MG/1
10 TABLET ORAL DAILY
Status: DISCONTINUED | OUTPATIENT
Start: 2022-10-11 | End: 2022-10-12

## 2022-10-11 RX ORDER — BENAZEPRIL HYDROCHLORIDE AND HYDROCHLOROTHIAZIDE 20; 12.5 MG/1; MG/1
2 TABLET ORAL DAILY
Status: DISCONTINUED | OUTPATIENT
Start: 2022-10-11 | End: 2022-10-11 | Stop reason: SDUPTHER

## 2022-10-11 RX ORDER — DIPHENHYDRAMINE HYDROCHLORIDE 50 MG/ML
INJECTION INTRAMUSCULAR; INTRAVENOUS
Status: COMPLETED
Start: 2022-10-11 | End: 2022-10-11

## 2022-10-11 RX ORDER — PRASUGREL 10 MG/1
10 TABLET, FILM COATED ORAL DAILY
Status: DISCONTINUED | OUTPATIENT
Start: 2022-10-12 | End: 2022-10-11

## 2022-10-11 RX ADMIN — SODIUM CHLORIDE: 9 INJECTION, SOLUTION INTRAVENOUS at 14:17:00

## 2022-10-11 RX ADMIN — LISINOPRIL 40 MG: 40 TABLET ORAL at 14:17:00

## 2022-10-11 RX ADMIN — HYDROCHLOROTHIAZIDE 25 MG: 25 TABLET ORAL at 14:17:00
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
## 2022-10-11 NOTE — PROGRESS NOTES
Shift Note:  Assumed care of patient @12:30 from cath lab. [facility] through right wrist access. TR band applied with 10cc of air. No sign of bleeding, pt denies any pain, numbness or tingling at the site, pulses +2/3. Patient alert and oriented x4, in and out of sleep from sedation, wife Wendy Nickerson at the bedside. Patient on room air, denies difficulty breathing. Denies any cardiac symptoms, NSR on tele. Denies pain at this time. Continent of bowel and bladder. Ambulates with stand by assist, call light within reach, tolerating care well.      POC:  - 4 hours of fluids  - Monitor overnight  - DC tomorrow

## 2022-10-12 VITALS
OXYGEN SATURATION: 94 % | DIASTOLIC BLOOD PRESSURE: 95 MMHG | RESPIRATION RATE: 15 BRPM | BODY MASS INDEX: 42.95 KG/M2 | HEIGHT: 70 IN | HEART RATE: 71 BPM | WEIGHT: 300 LBS | SYSTOLIC BLOOD PRESSURE: 160 MMHG | TEMPERATURE: 98 F

## 2022-10-12 LAB
ATRIAL RATE: 58 BPM
P AXIS: 23 DEGREES
P-R INTERVAL: 198 MS
Q-T INTERVAL: 444 MS
QRS DURATION: 82 MS
QTC CALCULATION (BEZET): 435 MS
R AXIS: 24 DEGREES
T AXIS: 48 DEGREES
VENTRICULAR RATE: 58 BPM

## 2022-10-12 PROCEDURE — 99211 OFF/OP EST MAY X REQ PHY/QHP: CPT

## 2022-10-12 RX ORDER — ASPIRIN 81 MG/1
81 TABLET, CHEWABLE ORAL DAILY
Refills: 3 | Status: SHIPPED | COMMUNITY
Start: 2022-10-13

## 2022-10-12 RX ADMIN — FENOFIBRATE 67 MG: 67 CAPSULE ORAL at 09:14:00

## 2022-10-12 RX ADMIN — LISINOPRIL 40 MG: 40 TABLET ORAL at 09:14:00

## 2022-10-12 RX ADMIN — ASPIRIN 81 MG: 81 TABLET, CHEWABLE ORAL at 09:15:00

## 2022-10-12 RX ADMIN — ATORVASTATIN CALCIUM 80 MG: 80 TABLET, FILM COATED ORAL at 09:15:00

## 2022-10-12 RX ADMIN — HYDROCHLOROTHIAZIDE 25 MG: 25 TABLET ORAL at 09:15:00

## 2022-10-12 RX ADMIN — METOPROLOL SUCCINATE 100 MG: 100 TABLET, EXTENDED RELEASE ORAL at 05:25:00

## 2022-10-12 RX ADMIN — AMLODIPINE BESYLATE 10 MG: 5 TABLET ORAL at 09:15:00

## 2022-10-12 NOTE — DISCHARGE PLANNING
Explained discharge instructions including medications, cardiac catheterization, and follow-up appointments to pt, verbalized understanding. Sent home with papers explaining discharge instructions. IV removed. Tele monitor discontinued. Belongings taken by patient. Discharged via ambulatory, accompanied by spouse.

## 2022-10-12 NOTE — PLAN OF CARE
Received patient at 0730. Alert and oriented x 4. Tele rhythm shows NSR. O2 saturation 95% on room air. No C/O chest pain or SOB. Skin dry and intact. Bed is locked and in low position. Call light and personal items within reach, wife at bedside. Reviewed plan of care and patient verbalizes understanding - plan to discharge today. Problem: CARDIOVASCULAR - ADULT  Goal: Maintains optimal cardiac output and hemodynamic stability  Description: INTERVENTIONS:  - Monitor vital signs, rhythm, and trends  - Monitor for bleeding, hypotension and signs of decreased cardiac output  - Evaluate effectiveness of vasoactive medications to optimize hemodynamic stability  - Monitor arterial and/or venous puncture sites for bleeding and/or hematoma  - Assess quality of pulses, skin color and temperature  - Assess for signs of decreased coronary artery perfusion - ex.  Angina  - Evaluate fluid balance, assess for edema, trend weights  Outcome: Progressing  Goal: Absence of cardiac arrhythmias or at baseline  Description: INTERVENTIONS:  - Continuous cardiac monitoring, monitor vital signs, obtain 12 lead EKG if indicated  - Evaluate effectiveness of antiarrhythmic and heart rate control medications as ordered  - Initiate emergency measures for life threatening arrhythmias  - Monitor electrolytes and administer replacement therapy as ordered  Outcome: Progressing     Problem: SKIN/TISSUE INTEGRITY - ADULT  Goal: Skin integrity remains intact  Description: INTERVENTIONS  - Assess and document risk factors for pressure ulcer development  - Assess and document skin integrity  - Monitor for areas of redness and/or skin breakdown  - Initiate interventions, skin care algorithm/standards of care as needed  Outcome: Progressing  Goal: Incision(s), wounds(s) or drain site(s) healing without S/S of infection  Description: INTERVENTIONS:  - Assess and document risk factors for pressure ulcer development  - Assess and document skin integrity  - Assess and document dressing/incision, wound bed, drain sites and surrounding tissue  - Implement wound care per orders  - Initiate isolation precautions as appropriate  - Initiate Pressure Ulcer prevention bundle as indicated  Outcome: Progressing     Problem: HEMATOLOGIC - ADULT  Goal: Maintains hematologic stability  Description: INTERVENTIONS  - Assess for signs and symptoms of bleeding or hemorrhage  - Monitor labs and vital signs for trends  - Administer supportive blood products/factors, fluids and medications as ordered and appropriate  - Administer supportive blood products/factors as ordered and appropriate  Outcome: Progressing  Goal: Free from bleeding injury  Description: (Example usage: patient with low platelets)  INTERVENTIONS:  - Avoid intramuscular injections, enemas and rectal medication administration  - Ensure safe mobilization of patient  - Hold pressure on venipuncture sites to achieve adequate hemostasis  - Assess for signs and symptoms of internal bleeding  - Monitor lab trends  - Patient is to report abnormal signs of bleeding to staff  - Avoid use of toothpicks and dental floss  - Use electric shaver for shaving  - Use soft bristle tooth brush  - Limit straining and forceful nose blowing  Outcome: Progressing

## 2022-10-12 NOTE — PLAN OF CARE
Pt alert x3 - vital signs stable- tele NSR-  S/p LHC- right wrist access- site c/d/i -no bleeding/ hematoma noted-   -plan of care explained to patient  -?? Home in am  Problem: CARDIOVASCULAR - ADULT  Goal: Maintains optimal cardiac output and hemodynamic stability  Description: INTERVENTIONS:  - Monitor vital signs, rhythm, and trends  - Monitor for bleeding, hypotension and signs of decreased cardiac output  - Evaluate effectiveness of vasoactive medications to optimize hemodynamic stability  - Monitor arterial and/or venous puncture sites for bleeding and/or hematoma  - Assess quality of pulses, skin color and temperature  - Assess for signs of decreased coronary artery perfusion - ex.  Angina  - Evaluate fluid balance, assess for edema, trend weights  Outcome: Progressing  Goal: Absence of cardiac arrhythmias or at baseline  Description: INTERVENTIONS:  - Continuous cardiac monitoring, monitor vital signs, obtain 12 lead EKG if indicated  - Evaluate effectiveness of antiarrhythmic and heart rate control medications as ordered  - Initiate emergency measures for life threatening arrhythmias  - Monitor electrolytes and administer replacement therapy as ordered  Outcome: Progressing     Problem: SKIN/TISSUE INTEGRITY - ADULT  Goal: Skin integrity remains intact  Description: INTERVENTIONS  - Assess and document risk factors for pressure ulcer development  - Assess and document skin integrity  - Monitor for areas of redness and/or skin breakdown  - Initiate interventions, skin care algorithm/standards of care as needed  Outcome: Progressing  Goal: Incision(s), wounds(s) or drain site(s) healing without S/S of infection  Description: INTERVENTIONS:  - Assess and document risk factors for pressure ulcer development  - Assess and document skin integrity  - Assess and document dressing/incision, wound bed, drain sites and surrounding tissue  - Implement wound care per orders  - Initiate isolation precautions as appropriate  - Initiate Pressure Ulcer prevention bundle as indicated  Outcome: Progressing     Problem: HEMATOLOGIC - ADULT  Goal: Maintains hematologic stability  Description: INTERVENTIONS  - Assess for signs and symptoms of bleeding or hemorrhage  - Monitor labs and vital signs for trends  - Administer supportive blood products/factors, fluids and medications as ordered and appropriate  - Administer supportive blood products/factors as ordered and appropriate  Outcome: Progressing  Goal: Free from bleeding injury  Description: (Example usage: patient with low platelets)  INTERVENTIONS:  - Avoid intramuscular injections, enemas and rectal medication administration  - Ensure safe mobilization of patient  - Hold pressure on venipuncture sites to achieve adequate hemostasis  - Assess for signs and symptoms of internal bleeding  - Monitor lab trends  - Patient is to report abnormal signs of bleeding to staff  - Avoid use of toothpicks and dental floss  - Use electric shaver for shaving  - Use soft bristle tooth brush  - Limit straining and forceful nose blowing  Outcome: Progressing

## 2022-12-11 ENCOUNTER — IMAGING SERVICES (OUTPATIENT)
Dept: GENERAL RADIOLOGY | Age: 52
End: 2022-12-11
Attending: FAMILY MEDICINE

## 2022-12-11 ENCOUNTER — WALK IN (OUTPATIENT)
Dept: URGENT CARE | Age: 52
End: 2022-12-11

## 2022-12-11 VITALS
BODY MASS INDEX: 43.67 KG/M2 | HEART RATE: 71 BPM | HEIGHT: 70 IN | OXYGEN SATURATION: 98 % | WEIGHT: 305 LBS | RESPIRATION RATE: 16 BRPM | DIASTOLIC BLOOD PRESSURE: 91 MMHG | SYSTOLIC BLOOD PRESSURE: 146 MMHG | TEMPERATURE: 98.1 F

## 2022-12-11 DIAGNOSIS — R07.81 RIB PAIN ON RIGHT SIDE: Primary | ICD-10-CM

## 2022-12-11 DIAGNOSIS — R07.81 RIB PAIN ON RIGHT SIDE: ICD-10-CM

## 2022-12-11 PROCEDURE — 71101 X-RAY EXAM UNILAT RIBS/CHEST: CPT | Performed by: RADIOLOGY

## 2022-12-11 PROCEDURE — 99204 OFFICE O/P NEW MOD 45 MIN: CPT | Performed by: FAMILY MEDICINE

## 2022-12-11 RX ORDER — BACLOFEN 10 MG/1
10 TABLET ORAL 3 TIMES DAILY PRN
Qty: 15 TABLET | Refills: 0 | Status: SHIPPED | OUTPATIENT
Start: 2022-12-11 | End: 2022-12-16

## 2022-12-11 RX ORDER — HYDROCHLOROTHIAZIDE 12.5 MG/1
12.5 TABLET ORAL DAILY
COMMUNITY

## 2022-12-11 RX ORDER — LEVOTHYROXINE SODIUM 0.12 MG/1
250 TABLET ORAL DAILY
COMMUNITY

## 2022-12-11 RX ORDER — ATORVASTATIN CALCIUM 80 MG/1
80 TABLET, FILM COATED ORAL DAILY
COMMUNITY

## 2022-12-11 RX ORDER — ASPIRIN 81 MG/1
81 TABLET, CHEWABLE ORAL DAILY
COMMUNITY

## 2022-12-11 RX ORDER — BENAZEPRIL HYDROCHLORIDE 20 MG/1
20 TABLET ORAL DAILY
COMMUNITY

## 2022-12-11 RX ORDER — METOPROLOL SUCCINATE 100 MG/1
100 TABLET, EXTENDED RELEASE ORAL DAILY
COMMUNITY

## 2022-12-11 RX ORDER — AMLODIPINE BESYLATE 10 MG/1
10 TABLET ORAL DAILY
COMMUNITY

## 2022-12-11 RX ORDER — FENOFIBRATE 48 MG/1
48 TABLET, COATED ORAL DAILY
COMMUNITY

## 2022-12-11 ASSESSMENT — PAIN SCALES - GENERAL: PAINLEVEL: 2

## 2025-04-09 ENCOUNTER — WALK IN (OUTPATIENT)
Dept: URGENT CARE | Age: 55
End: 2025-04-09

## 2025-04-09 VITALS
HEART RATE: 91 BPM | RESPIRATION RATE: 18 BRPM | OXYGEN SATURATION: 98 % | TEMPERATURE: 99.4 F | SYSTOLIC BLOOD PRESSURE: 158 MMHG | DIASTOLIC BLOOD PRESSURE: 96 MMHG

## 2025-04-09 DIAGNOSIS — J02.9 SORE THROAT: Primary | ICD-10-CM

## 2025-04-09 DIAGNOSIS — R05.9 COUGH, UNSPECIFIED TYPE: ICD-10-CM

## 2025-04-09 LAB
INTERNAL PROCEDURAL CONTROLS ACCEPTABLE: YES
INTERNAL PROCEDURAL CONTROLS ACCEPTABLE: YES
S PYO AG THROAT QL IA.RAPID: NEGATIVE
SARS-COV+SARS-COV-2 AG RESP QL IA.RAPID: NOT DETECTED
TEST LOT EXPIRATION DATE: NORMAL
TEST LOT EXPIRATION DATE: NORMAL
TEST LOT NUMBER: NORMAL
TEST LOT NUMBER: NORMAL

## 2025-04-09 PROCEDURE — 99204 OFFICE O/P NEW MOD 45 MIN: CPT | Performed by: FAMILY MEDICINE

## 2025-04-09 PROCEDURE — 87880 STREP A ASSAY W/OPTIC: CPT | Performed by: FAMILY MEDICINE

## 2025-04-09 PROCEDURE — 87426 SARSCOV CORONAVIRUS AG IA: CPT | Performed by: FAMILY MEDICINE

## 2025-04-09 RX ORDER — PREDNISONE 20 MG/1
40 TABLET ORAL DAILY
Qty: 10 TABLET | Refills: 0 | Status: SHIPPED | OUTPATIENT
Start: 2025-04-09 | End: 2025-04-14

## 2025-07-21 ENCOUNTER — WALK IN (OUTPATIENT)
Dept: URGENT CARE | Age: 55
End: 2025-07-21

## 2025-07-21 VITALS
DIASTOLIC BLOOD PRESSURE: 89 MMHG | TEMPERATURE: 97.6 F | OXYGEN SATURATION: 99 % | HEART RATE: 88 BPM | SYSTOLIC BLOOD PRESSURE: 127 MMHG | RESPIRATION RATE: 16 BRPM

## 2025-07-21 DIAGNOSIS — K42.9 UMBILICAL HERNIA WITHOUT OBSTRUCTION AND WITHOUT GANGRENE: Primary | ICD-10-CM

## 2025-07-21 PROCEDURE — 99213 OFFICE O/P EST LOW 20 MIN: CPT | Performed by: FAMILY MEDICINE

## 2025-07-21 ASSESSMENT — ENCOUNTER SYMPTOMS
HEADACHES: 0
FEVER: 0

## 2025-07-22 ENCOUNTER — OFFICE VISIT (OUTPATIENT)
Dept: SURGERY | Age: 55
End: 2025-07-22
Attending: FAMILY MEDICINE

## 2025-07-22 ENCOUNTER — TELEPHONE (OUTPATIENT)
Dept: SURGERY | Age: 55
End: 2025-07-22

## 2025-07-22 VITALS — HEIGHT: 73 IN | WEIGHT: 224.87 LBS | TEMPERATURE: 98.1 F | BODY MASS INDEX: 29.8 KG/M2

## 2025-07-22 DIAGNOSIS — K42.9 UMBILICAL HERNIA WITHOUT OBSTRUCTION AND WITHOUT GANGRENE: Primary | ICD-10-CM

## 2025-07-22 PROCEDURE — 99204 OFFICE O/P NEW MOD 45 MIN: CPT | Performed by: SURGERY

## 2025-07-22 ASSESSMENT — ENCOUNTER SYMPTOMS
GASTROINTESTINAL NEGATIVE: 1
NEUROLOGICAL NEGATIVE: 1
CONSTITUTIONAL NEGATIVE: 1
ENDOCRINE NEGATIVE: 1
ALLERGIC/IMMUNOLOGIC NEGATIVE: 1
HEMATOLOGIC/LYMPHATIC NEGATIVE: 1
RESPIRATORY NEGATIVE: 1

## 2025-07-31 ENCOUNTER — HOSPITAL ENCOUNTER (OUTPATIENT)
Dept: GENERAL RADIOLOGY | Age: 55
Discharge: HOME | End: 2025-07-31
Attending: ANESTHESIOLOGY

## 2025-07-31 PROCEDURE — 71045 X-RAY EXAM CHEST 1 VIEW: CPT

## 2025-07-31 RX ORDER — ROSUVASTATIN CALCIUM 40 MG/1
40 TABLET, COATED ORAL DAILY
COMMUNITY

## 2025-07-31 RX ORDER — CETIRIZINE HYDROCHLORIDE 10 MG/1
10 TABLET ORAL DAILY PRN
COMMUNITY

## 2025-07-31 RX ORDER — TIRZEPATIDE 10 MG/.5ML
10 INJECTION, SOLUTION SUBCUTANEOUS
COMMUNITY

## 2025-07-31 ASSESSMENT — ACTIVITIES OF DAILY LIVING (ADL)
SENSORY_SUPPORT_DEVICES: EYEGLASSES
ADL_SCORE: 12
ADL_BEFORE_ADMISSION: INDEPENDENT

## 2025-08-05 ENCOUNTER — ANESTHESIA EVENT (OUTPATIENT)
Dept: SURGERY | Age: 55
End: 2025-08-05

## 2025-08-06 ENCOUNTER — HOSPITAL ENCOUNTER (OUTPATIENT)
Age: 55
Discharge: HOME | End: 2025-08-06
Attending: SURGERY | Admitting: SURGERY

## 2025-08-06 ENCOUNTER — ANESTHESIA (OUTPATIENT)
Dept: SURGERY | Age: 55
End: 2025-08-06

## 2025-08-06 DIAGNOSIS — K42.9 UMBILICAL HERNIA: ICD-10-CM

## 2025-08-06 LAB
GLUCOSE BLDC GLUCOMTR-MCNC: 115 MG/DL (ref 70–99)
GLUCOSE BLDC GLUCOMTR-MCNC: 86 MG/DL (ref 70–99)

## 2025-08-06 PROCEDURE — 10002807 HB RX 258: Performed by: NURSE ANESTHETIST, CERTIFIED REGISTERED

## 2025-08-06 PROCEDURE — 10004651 HB RX, NO CHARGE ITEM: Performed by: SURGERY

## 2025-08-06 PROCEDURE — 13000099 HB GENERAL ROBOTIC CASE EA ADD MINUTE: Performed by: SURGERY

## 2025-08-06 PROCEDURE — 13000003 HB ANESTHESIA  GENERAL EA ADD MINUTE: Performed by: SURGERY

## 2025-08-06 PROCEDURE — 10002803 HB RX 637: Performed by: SURGERY

## 2025-08-06 PROCEDURE — 10004452 HB PACU ADDL 30 MINUTES: Performed by: SURGERY

## 2025-08-06 PROCEDURE — 13000098 HB GENERAL ROBOTIC CASE S/U + 1ST 15 MIN: Performed by: SURGERY

## 2025-08-06 PROCEDURE — A9150 MISC/EXPER NON-PRESCRIPT DRU: HCPCS | Performed by: SURGERY

## 2025-08-06 PROCEDURE — 10002800 HB RX 250 W HCPCS: Performed by: SURGERY

## 2025-08-06 PROCEDURE — 10002800 HB RX 250 W HCPCS: Performed by: NURSE ANESTHETIST, CERTIFIED REGISTERED

## 2025-08-06 PROCEDURE — 49591 RPR AA HRN 1ST < 3 CM RDC: CPT | Performed by: SURGERY

## 2025-08-06 PROCEDURE — 10006023 HB SUPPLY 272: Performed by: SURGERY

## 2025-08-06 PROCEDURE — 10002801 HB RX 250 W/O HCPCS: Performed by: SURGERY

## 2025-08-06 PROCEDURE — 10004451 HB PACU RECOVERY 1ST 30 MINUTES: Performed by: SURGERY

## 2025-08-06 PROCEDURE — 10006027 HB SUPPLY 278: Performed by: SURGERY

## 2025-08-06 PROCEDURE — 10002801 HB RX 250 W/O HCPCS: Performed by: NURSE ANESTHETIST, CERTIFIED REGISTERED

## 2025-08-06 PROCEDURE — 82962 GLUCOSE BLOOD TEST: CPT

## 2025-08-06 PROCEDURE — 10002807 HB RX 258: Performed by: SURGERY

## 2025-08-06 PROCEDURE — C1781 MESH (IMPLANTABLE): HCPCS | Performed by: SURGERY

## 2025-08-06 PROCEDURE — 10002800 HB RX 250 W HCPCS

## 2025-08-06 PROCEDURE — 13000001 HB PHASE II RECOVERY EA 30 MINUTES: Performed by: SURGERY

## 2025-08-06 PROCEDURE — 13000002 HB ANESTHESIA  GENERAL   S/U + 1ST 15 MIN: Performed by: SURGERY

## 2025-08-06 DEVICE — MESH VENTRALIGHT ST 6X4IN ELLIPSE SEPRA PP MONO LOWPRFL MED: Type: IMPLANTABLE DEVICE | Site: UMBILICAL | Status: FUNCTIONAL

## 2025-08-06 RX ORDER — LIDOCAINE HYDROCHLORIDE 20 MG/ML
INJECTION, SOLUTION INFILTRATION; PERINEURAL PRN
Status: DISCONTINUED | OUTPATIENT
Start: 2025-08-06 | End: 2025-08-06

## 2025-08-06 RX ORDER — DROPERIDOL 2.5 MG/ML
0.62 INJECTION, SOLUTION INTRAMUSCULAR; INTRAVENOUS
Status: DISCONTINUED | OUTPATIENT
Start: 2025-08-06 | End: 2025-08-06 | Stop reason: HOSPADM

## 2025-08-06 RX ORDER — PROPOFOL 10 MG/ML
INJECTION, EMULSION INTRAVENOUS PRN
Status: DISCONTINUED | OUTPATIENT
Start: 2025-08-06 | End: 2025-08-06

## 2025-08-06 RX ORDER — ONDANSETRON 2 MG/ML
INJECTION INTRAMUSCULAR; INTRAVENOUS PRN
Status: DISCONTINUED | OUTPATIENT
Start: 2025-08-06 | End: 2025-08-06

## 2025-08-06 RX ORDER — SODIUM CHLORIDE, SODIUM LACTATE, POTASSIUM CHLORIDE, CALCIUM CHLORIDE 600; 310; 30; 20 MG/100ML; MG/100ML; MG/100ML; MG/100ML
INJECTION, SOLUTION INTRAVENOUS CONTINUOUS PRN
Status: DISCONTINUED | OUTPATIENT
Start: 2025-08-06 | End: 2025-08-06

## 2025-08-06 RX ORDER — ROCURONIUM BROMIDE 10 MG/ML
INJECTION, SOLUTION INTRAVENOUS PRN
Status: DISCONTINUED | OUTPATIENT
Start: 2025-08-06 | End: 2025-08-06

## 2025-08-06 RX ORDER — PROCHLORPERAZINE EDISYLATE 5 MG/ML
5 INJECTION INTRAMUSCULAR; INTRAVENOUS EVERY 4 HOURS PRN
Status: DISCONTINUED | OUTPATIENT
Start: 2025-08-06 | End: 2025-08-06 | Stop reason: HOSPADM

## 2025-08-06 RX ORDER — HYDROCODONE BITARTRATE AND ACETAMINOPHEN 5; 325 MG/1; MG/1
1 TABLET ORAL EVERY 4 HOURS PRN
Status: DISCONTINUED | OUTPATIENT
Start: 2025-08-06 | End: 2025-08-06 | Stop reason: HOSPADM

## 2025-08-06 RX ORDER — GLYCOPYRROLATE 0.2 MG/ML
INJECTION, SOLUTION INTRAMUSCULAR; INTRAVENOUS PRN
Status: DISCONTINUED | OUTPATIENT
Start: 2025-08-06 | End: 2025-08-06

## 2025-08-06 RX ORDER — KETOROLAC TROMETHAMINE 15 MG/ML
30 INJECTION, SOLUTION INTRAMUSCULAR; INTRAVENOUS
Status: COMPLETED | OUTPATIENT
Start: 2025-08-06 | End: 2025-08-06

## 2025-08-06 RX ORDER — HYDROCODONE BITARTRATE AND ACETAMINOPHEN 5; 325 MG/1; MG/1
1 TABLET ORAL EVERY 4 HOURS PRN
Qty: 10 TABLET | Refills: 0 | Status: SHIPPED | OUTPATIENT
Start: 2025-08-06

## 2025-08-06 RX ORDER — METOCLOPRAMIDE HYDROCHLORIDE 5 MG/ML
5 INJECTION INTRAMUSCULAR; INTRAVENOUS EVERY 6 HOURS PRN
Status: DISCONTINUED | OUTPATIENT
Start: 2025-08-06 | End: 2025-08-06 | Stop reason: HOSPADM

## 2025-08-06 RX ORDER — ACETAMINOPHEN 500 MG
1000 TABLET ORAL
Status: COMPLETED | OUTPATIENT
Start: 2025-08-06 | End: 2025-08-06

## 2025-08-06 RX ORDER — ONDANSETRON 2 MG/ML
4 INJECTION INTRAMUSCULAR; INTRAVENOUS 2 TIMES DAILY PRN
Status: DISCONTINUED | OUTPATIENT
Start: 2025-08-06 | End: 2025-08-06 | Stop reason: HOSPADM

## 2025-08-06 RX ORDER — SODIUM CHLORIDE, SODIUM LACTATE, POTASSIUM CHLORIDE, CALCIUM CHLORIDE 600; 310; 30; 20 MG/100ML; MG/100ML; MG/100ML; MG/100ML
INJECTION, SOLUTION INTRAVENOUS CONTINUOUS
Status: DISCONTINUED | OUTPATIENT
Start: 2025-08-06 | End: 2025-08-06 | Stop reason: HOSPADM

## 2025-08-06 RX ORDER — DEXAMETHASONE SODIUM PHOSPHATE 4 MG/ML
INJECTION, SOLUTION INTRA-ARTICULAR; INTRALESIONAL; INTRAMUSCULAR; INTRAVENOUS; SOFT TISSUE PRN
Status: DISCONTINUED | OUTPATIENT
Start: 2025-08-06 | End: 2025-08-06

## 2025-08-06 RX ORDER — DIPHENHYDRAMINE HYDROCHLORIDE 50 MG/ML
25 INJECTION, SOLUTION INTRAMUSCULAR; INTRAVENOUS EVERY 4 HOURS PRN
Status: DISCONTINUED | OUTPATIENT
Start: 2025-08-06 | End: 2025-08-06 | Stop reason: HOSPADM

## 2025-08-06 RX ORDER — NICOTINE POLACRILEX 4 MG
30 LOZENGE BUCCAL
Status: DISCONTINUED | OUTPATIENT
Start: 2025-08-06 | End: 2025-08-06 | Stop reason: HOSPADM

## 2025-08-06 RX ORDER — NEOSTIGMINE METHYLSULFATE 4 MG/4 ML
SYRINGE (ML) INTRAVENOUS PRN
Status: DISCONTINUED | OUTPATIENT
Start: 2025-08-06 | End: 2025-08-06

## 2025-08-06 RX ORDER — MIDAZOLAM HYDROCHLORIDE 1 MG/ML
INJECTION, SOLUTION INTRAMUSCULAR; INTRAVENOUS PRN
Status: DISCONTINUED | OUTPATIENT
Start: 2025-08-06 | End: 2025-08-06

## 2025-08-06 RX ORDER — 0.9 % SODIUM CHLORIDE 0.9 %
2 VIAL (ML) INJECTION EVERY 12 HOURS SCHEDULED
Status: DISCONTINUED | OUTPATIENT
Start: 2025-08-06 | End: 2025-08-06 | Stop reason: HOSPADM

## 2025-08-06 RX ORDER — NALOXONE HCL 0.4 MG/ML
0.2 VIAL (ML) INJECTION EVERY 5 MIN PRN
Status: DISCONTINUED | OUTPATIENT
Start: 2025-08-06 | End: 2025-08-06 | Stop reason: HOSPADM

## 2025-08-06 RX ORDER — DEXTROSE MONOHYDRATE 25 G/50ML
25 INJECTION, SOLUTION INTRAVENOUS PRN
Status: DISCONTINUED | OUTPATIENT
Start: 2025-08-06 | End: 2025-08-06 | Stop reason: HOSPADM

## 2025-08-06 RX ORDER — 0.9 % SODIUM CHLORIDE 0.9 %
10 VIAL (ML) INJECTION PRN
Status: DISCONTINUED | OUTPATIENT
Start: 2025-08-06 | End: 2025-08-06 | Stop reason: HOSPADM

## 2025-08-06 RX ORDER — HYDRALAZINE HYDROCHLORIDE 20 MG/ML
5 INJECTION INTRAMUSCULAR; INTRAVENOUS EVERY 10 MIN PRN
Status: DISCONTINUED | OUTPATIENT
Start: 2025-08-06 | End: 2025-08-06 | Stop reason: HOSPADM

## 2025-08-06 RX ADMIN — HYDROMORPHONE HYDROCHLORIDE 0.4 MG: 1 INJECTION, SOLUTION INTRAMUSCULAR; INTRAVENOUS; SUBCUTANEOUS at 14:38

## 2025-08-06 RX ADMIN — MIDAZOLAM HYDROCHLORIDE 2 MG: 1 INJECTION, SOLUTION INTRAMUSCULAR; INTRAVENOUS at 13:12

## 2025-08-06 RX ADMIN — WATER 2000 MG: 1 INJECTION INTRAMUSCULAR; INTRAVENOUS; SUBCUTANEOUS at 13:10

## 2025-08-06 RX ADMIN — SODIUM CHLORIDE, POTASSIUM CHLORIDE, SODIUM LACTATE AND CALCIUM CHLORIDE: 600; 310; 30; 20 INJECTION, SOLUTION INTRAVENOUS at 11:51

## 2025-08-06 RX ADMIN — DEXAMETHASONE SODIUM PHOSPHATE 4 MG: 4 INJECTION INTRA-ARTICULAR; INTRALESIONAL; INTRAMUSCULAR; INTRAVENOUS; SOFT TISSUE at 13:29

## 2025-08-06 RX ADMIN — MORPHINE SULFATE 2 MG: 4 INJECTION INTRAVENOUS at 14:51

## 2025-08-06 RX ADMIN — HYDROCODONE BITARTRATE AND ACETAMINOPHEN 1 TABLET: 5; 325 TABLET ORAL at 16:02

## 2025-08-06 RX ADMIN — HYDROMORPHONE HYDROCHLORIDE 0.4 MG: 1 INJECTION, SOLUTION INTRAMUSCULAR; INTRAVENOUS; SUBCUTANEOUS at 14:32

## 2025-08-06 RX ADMIN — SODIUM CHLORIDE, POTASSIUM CHLORIDE, SODIUM LACTATE AND CALCIUM CHLORIDE: 600; 310; 30; 20 INJECTION, SOLUTION INTRAVENOUS at 13:10

## 2025-08-06 RX ADMIN — PROPOFOL 200 MG: 10 INJECTION, EMULSION INTRAVENOUS at 13:15

## 2025-08-06 RX ADMIN — Medication 100 MG: at 13:15

## 2025-08-06 RX ADMIN — HYDROMORPHONE HYDROCHLORIDE 0.2 MG: 1 INJECTION, SOLUTION INTRAMUSCULAR; INTRAVENOUS; SUBCUTANEOUS at 14:43

## 2025-08-06 RX ADMIN — ROCURONIUM BROMIDE 50 MG: 10 INJECTION INTRAVENOUS at 13:15

## 2025-08-06 RX ADMIN — ACETAMINOPHEN 1000 MG: 500 TABLET ORAL at 11:43

## 2025-08-06 RX ADMIN — GLYCOPYRROLATE 0.7 MG: 0.2 INJECTION, SOLUTION INTRAMUSCULAR; INTRAVENOUS at 14:05

## 2025-08-06 RX ADMIN — FENTANYL CITRATE 50 MCG: 50 INJECTION INTRAMUSCULAR; INTRAVENOUS at 13:27

## 2025-08-06 RX ADMIN — KETOROLAC TROMETHAMINE 30 MG: 15 INJECTION, SOLUTION INTRAMUSCULAR; INTRAVENOUS at 15:43

## 2025-08-06 RX ADMIN — LIDOCAINE HYDROCHLORIDE 5 ML: 20 INJECTION, SOLUTION INFILTRATION; PERINEURAL at 13:15

## 2025-08-06 RX ADMIN — MORPHINE SULFATE 2 MG: 4 INJECTION INTRAVENOUS at 14:56

## 2025-08-06 RX ADMIN — Medication 4.5 MG: at 14:05

## 2025-08-06 RX ADMIN — ONDANSETRON 4 MG: 2 INJECTION INTRAMUSCULAR; INTRAVENOUS at 14:05

## 2025-08-06 SDOH — SOCIAL STABILITY: SOCIAL INSECURITY: RISK FACTORS: HEART DISEASE

## 2025-08-06 SDOH — SOCIAL STABILITY: SOCIAL INSECURITY: RISK FACTORS: BMI> 30 (OBESITY)

## 2025-08-06 SDOH — SOCIAL STABILITY: SOCIAL INSECURITY: RISK FACTORS: SLEEP APNEA

## 2025-08-06 SDOH — SOCIAL STABILITY: SOCIAL INSECURITY: RISK FACTORS: AGE

## 2025-08-06 ASSESSMENT — PAIN SCALES - GENERAL
PAINLEVEL_OUTOF10: 8
PAINLEVEL_OUTOF10: 6
PAINLEVEL_OUTOF10: 5
PAINLEVEL_OUTOF10: 0
PAINLEVEL_OUTOF10: 8
PAINLEVEL_OUTOF10: 5
PAINLEVEL_OUTOF10: 0
PAINLEVEL_OUTOF10: 5
PAINLEVEL_OUTOF10: 6
PAINLEVEL_OUTOF10: 7
PAINLEVEL_OUTOF10: 5
PAINLEVEL_OUTOF10: 0
PAINLEVEL_OUTOF10: 6
PAINLEVEL_OUTOF10: 7
PAINLEVEL_OUTOF10: 4

## 2025-08-06 ASSESSMENT — PAIN DESCRIPTION - PAIN TYPE: TYPE: ACUTE PAIN

## 2025-08-06 ASSESSMENT — ENCOUNTER SYMPTOMS: EXERCISE TOLERANCE: GOOD (>4 METS)

## 2025-08-07 VITALS
SYSTOLIC BLOOD PRESSURE: 122 MMHG | HEART RATE: 73 BPM | DIASTOLIC BLOOD PRESSURE: 82 MMHG | BODY MASS INDEX: 31.18 KG/M2 | RESPIRATION RATE: 18 BRPM | TEMPERATURE: 97.9 F | OXYGEN SATURATION: 93 % | HEIGHT: 70 IN | WEIGHT: 217.81 LBS

## 2025-08-20 ENCOUNTER — APPOINTMENT (OUTPATIENT)
Dept: SURGERY | Age: 55
End: 2025-08-20

## 2025-08-20 VITALS — BODY MASS INDEX: 29.69 KG/M2 | WEIGHT: 224 LBS | HEIGHT: 73 IN

## 2025-08-20 DIAGNOSIS — K42.9 UMBILICAL HERNIA WITHOUT OBSTRUCTION AND WITHOUT GANGRENE: Primary | ICD-10-CM

## 2025-08-20 PROCEDURE — 99212 OFFICE O/P EST SF 10 MIN: CPT | Performed by: SURGERY

## 2025-08-20 ASSESSMENT — PAIN SCALES - GENERAL: PAINLEVEL_OUTOF10: 2

## (undated) DEVICE — SYRINGE 30 ML CONC TIP GRAD NONPYROGENIC DEHP FREE LOK MED

## (undated) DEVICE — GLOVE SURG 7.5 PROTEXIS LF CRM PF SMTH BEAD CUFF STRL

## (undated) DEVICE — SCISSORS LAPSCP 31.75CM 8MM DA VINCI XI HOT SHR EWRST 38D

## (undated) DEVICE — Device

## (undated) DEVICE — GOWN SURG LG FABRIC ASTOUND BLUE LVL 3 NONREINFORCE SET IN

## (undated) DEVICE — FORCEPS LAPSCP OD8 MM PROGRASP ENDOWRIST 18USE DA VINCI X XI

## (undated) DEVICE — SEAL CANNULA ID5-12 MM NONE

## (undated) DEVICE — NEEDLE INSFL L100 MM OD14 GA STEP PNEUMOPERITONEUM TROCAR

## (undated) DEVICE — BLADE SURG 15 STRL PRSNA + PLMR

## (undated) DEVICE — SYSTEM SMOKE EVAC UNV PLUME FILTRATION FLOW ADJ VLV LL LOCK

## (undated) DEVICE — SYRINGE 10 ML GRAD NONPYROGENIC DEHP FREE PVC FREE LOK MED

## (undated) DEVICE — GLOVE SURG 6 PROTEXIS PI PWDR FREE SMTH BEAD CUFF INTLK

## (undated) DEVICE — WATER STRL PLASTIC POUR BTL 500 ML

## (undated) DEVICE — SUTURE VCL+ MTPS 4-0 PS2 27IN BRAID COAT ABS

## (undated) DEVICE — SYSTEM IMG LAPAROVUE VUETIP PREFL DEFOG SOL RADOPQ TROCAR

## (undated) DEVICE — DEVICE CLSR L6 IN 0 GS-22 SYNETURE V-LOC NON-ABSORBABLE BLUE

## (undated) DEVICE — GLOVE SURG 6.5 PROTEXIS LF CRM PF BEAD CUFF STRL PLISPRN

## (undated) DEVICE — COVER STAND W23 IN REINFORCE PLASTIC

## (undated) DEVICE — TUBING INSFL PNEUMOCLEAR SET HFL

## (undated) DEVICE — ADHESIVE SKIN CLSR DERMABOND ADVANCED .7 ML LIQUID APL

## (undated) DEVICE — DRAPE EQUIPMENT CLMN DA VINCI XI

## (undated) DEVICE — SYRINGE 50 ML ECNTRC TIP BD MED

## (undated) DEVICE — NEEDLE HPO L1 12 IN OD16 GA REG WALL BD PP REG BVL LL HUB

## (undated) DEVICE — DRIVER NDL L31.75 CM 40 D MEGA OD8 MM MEGA SUT CUT ENDOWRIST

## (undated) DEVICE — GLOVE SURG 6 PROTEXIS PI CLASSIC PWDR FREE BEAD CUFF PLISPRN

## (undated) DEVICE — DEVICE CLSR L9 IN 2-0 GS-22 TPR PT V-LOC 180 ABS GRN

## (undated) DEVICE — NEEDLE HPO 22GA 1.5IN BVL ORT SELF LVL SHTH SFSHLD STRL LF

## (undated) DEVICE — ELECTRODE PT RTN L15 FT VALLEYLAB REM POLYHESIVE ACRYLIC

## (undated) DEVICE — GLOVE SURG 5.5 PROTEXIS LF CRM PF BEAD CUFF STRL PLISPRN

## (undated) DEVICE — TIP ELECTROCAUTERY HOT SHR DA VINCI ENDOWRIST CAUT MNPLR 8

## (undated) DEVICE — KIT SURG GEN ROBOTIC LF GSAM

## (undated) DEVICE — 2% CHLORHEXIDINE SKIN PREP ORANGE 26ML

## (undated) DEVICE — DRAPE EQUIPMENT ARM L21 IN X W19 IN X H10.5 IN DA VINCI XI

## (undated) DEVICE — GLOVE SURG 7 PROTEXIS LF BLUE PF SMTH BEAD CUFF INTLK STRL

## (undated) NOTE — ED AVS SNAPSHOT
Leslye Ask   MRN: [id number]    Department:  [facility] Emergency Department   Date of Visit:  6/4/2019           Disclosure     Insurance plans vary and the physician(s) referred by the ER may not be covered by your plan.  Please contact y tell this physician (or your personal doctor if your instructions are to return to your personal doctor) about any new or lasting problems. The primary care or specialist physician will see patients referred from the [facility] Emergency Department.  Cheryle Levins